# Patient Record
Sex: MALE | Race: WHITE | Employment: OTHER | URBAN - METROPOLITAN AREA
[De-identification: names, ages, dates, MRNs, and addresses within clinical notes are randomized per-mention and may not be internally consistent; named-entity substitution may affect disease eponyms.]

---

## 2018-09-10 ENCOUNTER — THERAPY VISIT (OUTPATIENT)
Dept: PHYSICAL THERAPY | Facility: CLINIC | Age: 53
End: 2018-09-10
Payer: COMMERCIAL

## 2018-09-10 DIAGNOSIS — M25.552 HIP PAIN, LEFT: Primary | ICD-10-CM

## 2018-09-10 PROCEDURE — 97161 PT EVAL LOW COMPLEX 20 MIN: CPT | Mod: GP | Performed by: PHYSICAL THERAPIST

## 2018-09-10 PROCEDURE — 97110 THERAPEUTIC EXERCISES: CPT | Mod: GP | Performed by: PHYSICAL THERAPIST

## 2018-09-10 PROCEDURE — 97530 THERAPEUTIC ACTIVITIES: CPT | Mod: GP | Performed by: PHYSICAL THERAPIST

## 2018-09-10 NOTE — MR AVS SNAPSHOT
After Visit Summary   9/10/2018    Ruiz De Anda    MRN: [id number]           Patient Information     Date Of Birth          1965        Visit Information        Provider Department      9/10/2018 7:30 AM Alexandra Willams PT [facility]        Today's Diagnoses     Hip pain, left    -  1       Follow-ups after your visit        Your next 10 appointments already scheduled     Oct 04, 2018  7:30 AM CDT   [facility] Extremity with Alexandra Willams PT   [facility] ([facility] [place]  )    [address] [place] [address] [phone]              Who to contact     If you have questions or need follow up information about today's clinic visit or your schedule please contact [facility] PHYSICAL THERAPY directly at [phone].  Normal or non-critical lab and imaging results will be communicated to you by MyChart, letter or phone within 4 business days after the clinic has received the results. If you do not hear from us within 7 days, please contact the clinic through MyChart or phone. If you have a critical or abnormal lab result, we will notify you by phone as soon as possible.  Submit refill requests through [employer] or call your pharmacy and they will forward the refill request to us. Please allow 3 business days for your refill to be completed.          Additional Information About Your Visit        Care EveryWhere ID     This is your Care EveryWhere ID. This could be used by other organizations to access your [place] medical records  [id number]         Blood Pressure from Last 3 Encounters:   No data found for BP    Weight from Last 3 Encounters:   No data found for Wt              We Performed the Following     [facility] Inital Eval Report     PT Eval, Low Complexity (94708)     Therapeutic Activities     Therapeutic Exercises        Primary Care Provider Fax  #    Physician No Ref-Primary [phone]       No address on file        Equal Access to Services     LIZZ HUYNHBERKLEY : Eva aad ku hadbubbanatalia Daniel, gideonyuni paulino, brendayang landaverdebryonyuni danielbrennayuni, aaron mckeon jesúsgogo urbanroland arcos natali . So [facility] [phone].    ATENCIÓN: Si habla español, tiene a piedra disposición servicios gratuitos de asistencia lingüística. Llame al [phone].    We comply with applicable federal civil rights laws and [place] laws. We do not discriminate on the basis of race, color, national origin, age, disability, sex, sexual orientation, or gender identity.            Thank you!     Thank you for choosing [place] [facility] PH[facility] THER[facility]  for your care. Our goal is always to provide you with excellent care. Hearing back from our patients is one way we can continue to improve our services. Please take a few minutes to complete the written survey that you may receive in the mail after your visit with us. Thank you!             Your Updated Medication List - Protect others around you: Learn how to safely use, store and throw away your medicines at www.disposemymeds.org.      Notice  As of 9/10/2018 12:11 PM    You have not been prescribed any medications.

## 2018-09-10 NOTE — PROGRESS NOTES
[facility] Initial Evaluation  Subjective:  Patient is a 53 year old male presenting with rehab left hip hpi.                                              Current occupation is retired.    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                                Objective:  System    Physical Exam    General     ROS    Assessment/Plan:

## 2018-09-10 NOTE — LETTER
[facility] PHYSICAL THERAPY   [address] [address]  [phone]    September 10, 2018    Re: Ruiz De Anda   :   1965  MRN:  [id number]   REFERRING PHYSICIAN:   Jose F Beyer    [facility] PHYSICAL THERAPY  Date of Initial Evaluation:  9/10/18  Visits:  Rxs Used: 1  Reason for Referral:  Hip pain, left    EVALUATION SUMMARY    [facility] Evaluation    Subjective:  Patient is a 53 year old male presenting with rehab left hip hpi.   Ruiz De Anda is a 53 year old male with a left hip condition.  Condition occurred with:  Repetition/overuse (Pt. has had a gradual onset of L hip pain x 1 year w/out incident. Pt. has had ongoing pain with sit to stand transfers and prolonged walking. Pt. has had unsuccessful chiropractic rx thus far. Pt. with hx of LBP but not hip injury. ).  Condition occurred: for unknown reasons.  This is a chronic condition  .    Patient reports pain:  Posterior.  Radiates to:  Thigh.  Pain is described as sharp and is intermittent and reported as 8/10.  Associated symptoms:  Loss of motion/stiffness and loss of strength. Pain is worse in the P.M..  Symptoms are exacerbated by standing, transfers and walking and relieved by rest.  Since onset symptoms are unchanged.  Special tests:  X-ray (negative).  Previous treatment includes chiropractic.  There was no improvement following previous treatment.  General health as reported by patient is good. Current occupation is retired.    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    Objective:  Standing Alignment:    Pelvic:  Normal  Hip:  Normal    Gait:  Slight limp on L  Gait Type:  Antalgic       Flexibility/Screens:   Positive screens:  HipNegative screens: Lumbar     Lower Extremity:  Decreased left lower extremity flexibility:Piriformis and Hip Flexors    Hip Evaluation  Hip PROM:    Flexion: Left: 110    Right: 120  Extension: Left: 8   Right: 30  External Rotation: Left: 35    Right: 45        Re: Ruiz De Anda     Hip Strength:    Flexion:   Left: 4/5   Pain:    Extension:  Left: 4-/5  Pain:  Abduction:  Left: 4-/5     Pain:  Internal Rotation:  Left: 5/5    Pain:  External Rotation:  Left: 5/5   Pain:       Hip Special Testing:    Left hip positive for the following special tests:  Fadir/Labrum and Ar      Hip Palpation:    Left hip tenderness present at:   Greater Trachanter and Gluteus Medius    Functional Testing:    Quad:    Single leg squat:   Left:   Moderate loss of control  Right:      Assessment/Plan:    Patient is a 53 year old male with left side hip complaints.    Patient has the following significant findings with corresponding treatment plan.                Diagnosis 1:  L hip pain  Pain -  manual therapy, self management and education  Decreased ROM/flexibility - manual therapy and therapeutic exercise  Decreased strength - therapeutic exercise and therapeutic activities  Impaired muscle performance - neuro re-education  Decreased function - therapeutic activities    Therapy Evaluation Codes:   1) History comprised of:   Personal factors that impact the plan of care:      Time since onset of symptoms.    Comorbidity factors that impact the plan of care are:      None.     Medications impacting care: None.  2) Examination of Body Systems comprised of:   Body structures and functions that impact the plan of care:      Hip.   Activity limitations that impact the plan of care are:      Walking.  3) Clinical presentation characteristics are:   Stable/Uncomplicated.  4) Decision-Making    Low complexity using standardized patient assessment instrument and/or measureable assessment of functional outcome.  Cumulative Therapy Evaluation is: Low complexity.    Previous and current functional limitations:  (See Goal Flow Sheet for this information)    Short term and Long term goals: (See Goal Flow Sheet  for this information)     Communication ability:  Patient appears to be able to clearly communicate and understand verbal and written communication and follow directions correctly.  Treatment Explanation - The following has been discussed with the patient:   RX ordered/plan of care  Anticipated outcomes  Possible risks and side effects  Re: Ruiz De Anda     This patient would benefit from PT intervention to resume normal activities.   Rehab potential is good.    Frequency:  2 X a month, once daily  Duration:  for 2 months  Discharge Plan:  Achieve all LTG.  Independent in home treatment program.  Reach maximal therapeutic benefit.    Please refer to the daily flowsheet for treatment today, total treatment time and time spent performing 1:1 timed codes.     Thank you for your referral.    INQUIRIES  Therapist: Alexandra Willams, PT   [facility] PH[facility]ICAL THERAP[facility]  [address] [place] [id number]  Phone: [phone]  Fax: [phone]

## 2018-09-10 NOTE — PROGRESS NOTES
[facility] Initial Evaluation        Subjective:  Patient is a 53 year old male presenting with rehab left hip hpi.   Ruiz De Anda is a 53 year old male with a left hip condition.  Condition occurred with:  Repetition/overuse (Pt. has had a gradual onset of L hip pain x 1 year w/out incident. Pt. has had ongoing pain with sit to stand transfers and prolonged walking. Pt. has had unsuccessful chiropractic rx thus far. Pt. with hx of LBP but not hip injury. ).  Condition occurred: for unknown reasons.  This is a chronic condition  2017.    Patient reports pain:  Posterior.  Radiates to:  Thigh.  Pain is described as sharp and is intermittent and reported as 8/10.  Associated symptoms:  Loss of motion/stiffness and loss of strength. Pain is worse in the P.M..  Symptoms are exacerbated by standing, transfers and walking and relieved by rest.  Since onset symptoms are unchanged.  Special tests:  X-ray (negative).  Previous treatment includes chiropractic.  There was no improvement following previous treatment.  General health as reported by patient is good.                                              Objective:  Standing Alignment:          Pelvic:  Normal  Hip:  Normal        Gait:  Slight limp on L  Gait Type:  Antalgic         Flexibility/Screens:   Positive screens:  HipNegative screens: Lumbar     Lower Extremity:  Decreased left lower extremity flexibility:Piriformis and Hip Flexors                                                   Hip Evaluation  Hip PROM:    Flexion: Left: 110   Right: 120  Extension: Left: 8   Right: 30        External Rotation: Left: 35    Right: 45              Hip Strength:    Flexion:   Left: 4/5   Pain:                      Extension:  Left: 4-/5  Pain:  Abduction:  Left: 4-/5     Pain:    Internal Rotation:  Left: 5/5    Pain:  External Rotation:  Left: 5/5   Pain:              Hip Special Testing:    Left hip positive for the following special tests:  Fadir/Labrum  evelyn Joyner      Hip Palpation:    Left hip tenderness present at:   Greater Trachanter and Gluteus Medius    Functional Testing:          Quad:    Single leg squat:   Left:   Moderate loss of control  Right:                       General     ROS    Assessment/Plan:    Patient is a 53 year old male with left side hip complaints.    Patient has the following significant findings with corresponding treatment plan.                Diagnosis 1:  L hip pain  Pain -  manual therapy, self management and education  Decreased ROM/flexibility - manual therapy and therapeutic exercise  Decreased strength - therapeutic exercise and therapeutic activities  Impaired muscle performance - neuro re-education  Decreased function - therapeutic activities    Therapy Evaluation Codes:   1) History comprised of:   Personal factors that impact the plan of care:      Time since onset of symptoms.    Comorbidity factors that impact the plan of care are:      None.     Medications impacting care: None.  2) Examination of Body Systems comprised of:   Body structures and functions that impact the plan of care:      Hip.   Activity limitations that impact the plan of care are:      Walking.  3) Clinical presentation characteristics are:   Stable/Uncomplicated.  4) Decision-Making    Low complexity using standardized patient assessment instrument and/or measureable assessment of functional outcome.  Cumulative Therapy Evaluation is: Low complexity.    Previous and current functional limitations:  (See Goal Flow Sheet for this information)    Short term and Long term goals: (See Goal Flow Sheet for this information)     Communication ability:  Patient appears to be able to clearly communicate and understand verbal and written communication and follow directions correctly.  Treatment Explanation - The following has been discussed with the patient:   RX ordered/plan of care  Anticipated outcomes  Possible risks and side effects  This patient would  benefit from PT intervention to resume normal activities.   Rehab potential is good.    Frequency:  2 X a month, once daily  Duration:  for 2 months  Discharge Plan:  Achieve all LTG.  Independent in home treatment program.  Reach maximal therapeutic benefit.    Please refer to the daily flowsheet for treatment today, total treatment time and time spent performing 1:1 timed codes.

## 2018-10-04 ENCOUNTER — THERAPY VISIT (OUTPATIENT)
Dept: PHYSICAL THERAPY | Facility: CLINIC | Age: 53
End: 2018-10-04
Payer: COMMERCIAL

## 2018-10-04 DIAGNOSIS — M25.552 HIP PAIN, LEFT: ICD-10-CM

## 2018-10-04 PROCEDURE — 97110 THERAPEUTIC EXERCISES: CPT | Mod: GP | Performed by: PHYSICAL THERAPIST

## 2018-10-04 PROCEDURE — 97530 THERAPEUTIC ACTIVITIES: CPT | Mod: GP | Performed by: PHYSICAL THERAPIST

## 2018-10-04 NOTE — LETTER
[facility] PHYSICAL THERAPY   [address] [address]  [phone]    2018    Re: Ruiz De Anda   :   1965  MRN:  [id number]   REFERRING PHYSICIAN:   Jose F Beyer    [facility] PHYSICAL [facility]  Date of Initial Evaluation:  09/10/2018  Visits:  Rxs Used: 2  Reason for Referral:  Hip pain, left    DISCHARGE REPORT  Progress reporting period is from 9-10-18 to 10-4-18.       SUBJECTIVE  Subjective changes noted by patient:   Pt. has made very good progress the past 4 weeks with little to no pain anymore in his L hip. He reports no problems any longer with transfers and has no limitation in walking or functional activities. Pt. meseret continue his HEP with no further visits planned unless increased problems arise.      Current pain level is  1/10.     Previous pain level was  8/10.   Changes in function:  Yes (See Goal flowsheet attached for changes in current functional level)  Adverse reaction to treatment or activity: None    OBJECTIVE  Changes noted in objective findings:  PROM L hip ER 45 deg. Strength L hip flex 5/5; abd 4+/5; ext 4+/5.     ASSESSMENT/PLAN  Updated problem list and treatment plan: Diagnosis 1:  L hip pain  Pain -  self management and education  Decreased ROM/flexibility - manual therapy and therapeutic exercise  Decreased strength - therapeutic exercise and therapeutic activities  Impaired muscle performance - neuro re-education  Decreased function - therapeutic activities  STG/LTGs have been met or progress has been made towards goals:  Yes (See Goal flow sheet completed today.)  Assessment of Progress: The patient has met all of their long term goals.  Self Management Plans:  Patient is independent in a home treatment program.  Patient is independent in self management of symptoms.  I have re-evaluated this patient and find that the nature, scope, duration and intensity of the therapy is  appropriate for the medical condition of the patient.  Ruiz continues to require the following intervention to meet STG and LTG's:  PT intervention is no longer required to meet STG/LTG.    Recommendations:  This patient is ready to be discharged from therapy and continue their home treatment program.    Please refer to the daily flowsheet for treatment today, total treatment time and time spent performing 1:1 timed codes.    Thank you for your referral.    INQUIRIES  Therapist: Alexandra Willams, PT  [facility] [place] [facility]  [address] [place] [address]  Phone: [phone]  Fax: [phone]

## 2018-10-04 NOTE — PROGRESS NOTES
Subjective:  HPI                    Objective:  System    Physical Exam    General     ROS    Assessment/Plan:    DISCHARGE REPORT    Progress reporting period is from 9-10-18 to 10-4-18.       SUBJECTIVE  Subjective changes noted by patient:   Pt. has made very good progress the past 4 weeks with little to no pain anymore in his L hip. He reports no problems any longer with transfers and has no limitation in walking or functional activities. Pt. meseret continue his HEP with no further visits planned unless increased problems arise.      Current pain level is  1/10.     Previous pain level was  8/10.   Changes in function:  Yes (See Goal flowsheet attached for changes in current functional level)  Adverse reaction to treatment or activity: None    OBJECTIVE  Changes noted in objective findings:  PROM L hip ER 45 deg. Strength L hip flex 5/5; abd 4+/5; ext 4+/5.     ASSESSMENT/PLAN  Updated problem list and treatment plan: Diagnosis 1:  L hip pain  Pain -  self management and education  Decreased ROM/flexibility - manual therapy and therapeutic exercise  Decreased strength - therapeutic exercise and therapeutic activities  Impaired muscle performance - neuro re-education  Decreased function - therapeutic activities  STG/LTGs have been met or progress has been made towards goals:  Yes (See Goal flow sheet completed today.)  Assessment of Progress: The patient has met all of their long term goals.  Self Management Plans:  Patient is independent in a home treatment program.  Patient is independent in self management of symptoms.  I have re-evaluated this patient and find that the nature, scope, duration and intensity of the therapy is appropriate for the medical condition of the patient.  Ruiz continues to require the following intervention to meet STG and LTG's:  PT intervention is no longer required to meet STG/LTG.    Recommendations:  This patient is ready to be discharged from therapy and continue their home  treatment program.    Please refer to the daily flowsheet for treatment today, total treatment time and time spent performing 1:1 timed codes.

## 2018-10-04 NOTE — MR AVS SNAPSHOT
After Visit Summary   10/4/2018    Ruiz De Anda    MRN: [id number]           Patient Information     Date Of Birth          1965        Visit Information        Provider Department      10/4/2018 7:30 AM Alexandra Willams PT [facility] Physical Therapy        Today's Diagnoses     Hip pain, left           Follow-ups after your visit        Who to contact     If you have questions or need follow up information about today's clinic visit or your schedule please contact [facility] PHYSICAL [facility] directly at [phone].  Normal or non-critical lab and imaging results will be communicated to you by MyChart, letter or phone within 4 business days after the clinic has received the results. If you do not hear from us within 7 days, please contact the clinic through MyChart or phone. If you have a critical or abnormal lab result, we will notify you by phone as soon as possible.  Submit refill requests through [employer] or call your pharmacy and they will forward the refill request to us. Please allow 3 business days for your refill to be completed.          Additional Information About Your Visit        Care EveryWhere ID     This is your Care EveryWhere ID. This could be used by other organizations to access your [place] medical records  [id number]         Blood Pressure from Last 3 Encounters:   No data found for BP    Weight from Last 3 Encounters:   No data found for Wt              We Performed the Following     [facility] Progress Notes Report     Therapeutic Activities     Therapeutic Exercises        Primary Care Provider Fax #    Physician No Ref-Primary [phone]       No address on file        Equal Access to Services     LIZZ ANDERSON : Hadii teresa Daniel, wachristina paulino, qaybta kaalaaron vidal. So [facility] [phone].    ATENCIÓN: Si marissala español, tiene a piedra disposición  servicios gratuitos de asistencia lingüística. Sabrina talavera [phone].    We comply with applicable federal civil rights laws and [place] laws. We do not discriminate on the basis of race, color, national origin, age, disability, sex, sexual orientation, or gender identity.            Thank you!     Thank you for choosing [place] [facility] PHYSICAL THERAPY  for your care. Our goal is always to provide you with excellent care. Hearing back from our patients is one way we can continue to improve our services. Please take a few minutes to complete the written survey that you may receive in the mail after your visit with us. Thank you!             Your Updated Medication List - Protect others around you: Learn how to safely use, store and throw away your medicines at www.disposemymeds.org.      Notice  As of 10/4/2018  8:06 AM    You have not been prescribed any medications.

## 2019-04-22 PROBLEM — M25.552 HIP PAIN, LEFT: Status: RESOLVED | Noted: 2018-09-10 | Resolved: 2019-04-22

## 2020-12-22 ENCOUNTER — RECORDS - HEALTHEAST (OUTPATIENT)
Dept: LAB | Facility: CLINIC | Age: 55
End: 2020-12-22

## 2020-12-22 LAB
CHOLEST SERPL-MCNC: 270 MG/DL
FASTING STATUS PATIENT QL REPORTED: ABNORMAL
FASTING STATUS PATIENT QL REPORTED: NORMAL
GLUCOSE BLD-MCNC: 117 MG/DL (ref 70–125)
HDLC SERPL-MCNC: 42 MG/DL
LDLC SERPL CALC-MCNC: 183 MG/DL
PSA SERPL-MCNC: 0.6 NG/ML (ref 0–3.5)
TRIGL SERPL-MCNC: 223 MG/DL

## 2020-12-23 LAB — HCV AB SERPL QL IA: NEGATIVE

## 2021-01-03 ENCOUNTER — HEALTH MAINTENANCE LETTER (OUTPATIENT)
Age: 56
End: 2021-01-03

## 2021-03-26 ENCOUNTER — RECORDS - HEALTHEAST (OUTPATIENT)
Dept: LAB | Facility: CLINIC | Age: 56
End: 2021-03-26

## 2021-03-26 LAB
AST SERPL W P-5'-P-CCNC: 28 U/L (ref 0–40)
CHOLEST SERPL-MCNC: 153 MG/DL
FASTING STATUS PATIENT QL REPORTED: ABNORMAL
HDLC SERPL-MCNC: 37 MG/DL
LDLC SERPL CALC-MCNC: 83 MG/DL
TRIGL SERPL-MCNC: 165 MG/DL

## 2021-10-10 ENCOUNTER — HEALTH MAINTENANCE LETTER (OUTPATIENT)
Age: 56
End: 2021-10-10

## 2022-01-29 ENCOUNTER — HEALTH MAINTENANCE LETTER (OUTPATIENT)
Age: 57
End: 2022-01-29

## 2022-09-18 ENCOUNTER — HEALTH MAINTENANCE LETTER (OUTPATIENT)
Age: 57
End: 2022-09-18

## 2023-05-07 ENCOUNTER — HEALTH MAINTENANCE LETTER (OUTPATIENT)
Age: 58
End: 2023-05-07